# Patient Record
Sex: MALE | Employment: FULL TIME | URBAN - NONMETROPOLITAN AREA
[De-identification: names, ages, dates, MRNs, and addresses within clinical notes are randomized per-mention and may not be internally consistent; named-entity substitution may affect disease eponyms.]

---

## 2018-03-22 ENCOUNTER — TELEPHONE (OUTPATIENT)
Dept: SURGERY | Age: 58
End: 2018-03-22

## 2022-01-18 ENCOUNTER — HOSPITAL ENCOUNTER (OUTPATIENT)
Dept: GENERAL RADIOLOGY | Age: 62
Discharge: HOME | End: 2022-01-18
Payer: COMMERCIAL

## 2022-01-18 ENCOUNTER — HOSPITAL ENCOUNTER (OUTPATIENT)
Age: 62
Discharge: HOME | End: 2022-01-18
Payer: COMMERCIAL

## 2022-01-18 DIAGNOSIS — M25.512 LEFT SHOULDER PAIN, UNSPECIFIED CHRONICITY: ICD-10-CM

## 2022-01-18 DIAGNOSIS — M25.522 LEFT ELBOW PAIN: ICD-10-CM

## 2022-01-18 PROCEDURE — 73080 X-RAY EXAM OF ELBOW: CPT

## 2022-01-18 PROCEDURE — 73030 X-RAY EXAM OF SHOULDER: CPT

## 2024-03-26 ENCOUNTER — OFFICE VISIT (OUTPATIENT)
Age: 64
End: 2024-03-26

## 2024-03-26 VITALS
HEART RATE: 74 BPM | SYSTOLIC BLOOD PRESSURE: 142 MMHG | TEMPERATURE: 97.9 F | HEIGHT: 71 IN | OXYGEN SATURATION: 96 % | WEIGHT: 259 LBS | DIASTOLIC BLOOD PRESSURE: 68 MMHG | RESPIRATION RATE: 18 BRPM | BODY MASS INDEX: 36.26 KG/M2

## 2024-03-26 DIAGNOSIS — E11.9 TYPE 2 DIABETES MELLITUS WITHOUT COMPLICATION, WITHOUT LONG-TERM CURRENT USE OF INSULIN (HCC): Primary | ICD-10-CM

## 2024-03-26 DIAGNOSIS — I10 PRIMARY HYPERTENSION: ICD-10-CM

## 2024-03-26 LAB — HBA1C MFR BLD: 11.2 %

## 2024-03-26 RX ORDER — COVID-19 ANTIGEN TEST
440 KIT MISCELLANEOUS PRN
COMMUNITY

## 2024-03-26 RX ORDER — METFORMIN HYDROCHLORIDE EXTENDED-RELEASE TABLETS 1000 MG/1
1 TABLET, FILM COATED, EXTENDED RELEASE ORAL 2 TIMES DAILY WITH MEALS
COMMUNITY
End: 2024-03-26

## 2024-03-26 RX ORDER — CLOPIDOGREL BISULFATE 75 MG/1
75 TABLET ORAL DAILY
COMMUNITY

## 2024-03-26 RX ORDER — ISOSORBIDE MONONITRATE 60 MG/1
60 TABLET, EXTENDED RELEASE ORAL EVERY MORNING
COMMUNITY
Start: 2021-01-28

## 2024-03-26 RX ORDER — ASPIRIN 81 MG/1
81 TABLET ORAL DAILY
COMMUNITY
Start: 2023-09-20

## 2024-03-26 RX ORDER — ATORVASTATIN CALCIUM 40 MG/1
40 TABLET, FILM COATED ORAL DAILY
COMMUNITY

## 2024-03-26 RX ORDER — GLIPIZIDE 5 MG/1
5 TABLET ORAL 2 TIMES DAILY
Qty: 180 TABLET | Refills: 0 | Status: SHIPPED | OUTPATIENT
Start: 2024-03-26 | End: 2024-06-24

## 2024-03-26 NOTE — PROGRESS NOTES
G6 ) ALEX; 1 each by Does not apply route as needed (yearly) Change yearly or as needed, Disp-1 each, R-0Normal  2. Primary hypertension  Assessment & Plan:  His BP is elevated today. He takes Imdur 60 mg daily. He doesn't check his BP at home. He doesn't follow certain diet either. Discussed about hypertension and it's effects on the body and target organ damages. Discussed about cutting salt intake, low fat  and low carb diet. Advised regular aerobic exercise. Advised check his BP at home and make a log and return it to the clinic in 2 to 3 weeks for review. If his BP comes elevated on the log, he might be prescribed Ace inh.   His last lab work is reviewed from 09/20/2023: BUN: 13, Crea: 0.94, GFR: 92.  His labs will be repeated along with his biometric exams on his next office visit.   He is a daily smoker with 0.5 ppd. Discussed about the effects of smoking on his overall health, blood pressure and heart disease. Discussed about possible smoking cessations. He stated that he tried chantix in the past but couldn't complete the treatment and he is not ready to quit. He will think about it, will be discussed on his next visit as well.  He verbalized understanding and agrees above the plan.    Return in about 10 weeks (around 6/4/2024), or if symptoms worsen or fail to improve, for A1C check .     Subjective   SUBJECTIVE/OBJECTIVE:  Yunior Hicks . 64 y.o. male presented to the clinic to re-check his A1C bc his wife is concerned about his BG. Yunior Hicks  Has CAD with 3 stents placements, diabetes mellitus, hypertension, and hyperlipidemia. He is non-compliant with his medications.     CAD: He had 1 stent placement in 10/11/2011 by Dr Parsons, and 2 stents placement in 07/16/2018 by Dr Joey Parr. He follows Dr Parr every 6 to 8 months. His last office visit was 01/04/2024. He takes Plavix, aspirin 81 mg, Imdur 60 mg, and Atorvastatin 40 mg. He had increased chest heaviness and SOB

## 2024-04-01 PROBLEM — H91.8X3 ASYMMETRICAL HEARING LOSS: Status: ACTIVE | Noted: 2022-01-07

## 2024-04-01 PROBLEM — E66.9 OBESITY (BMI 30.0-34.9): Status: ACTIVE | Noted: 2021-09-28

## 2024-04-01 PROBLEM — Z95.5 PRESENCE OF CORONARY ANGIOPLASTY IMPLANT AND GRAFT: Status: ACTIVE | Noted: 2022-01-07

## 2024-04-01 PROBLEM — E11.9 TYPE 2 DIABETES MELLITUS WITHOUT COMPLICATION, WITHOUT LONG-TERM CURRENT USE OF INSULIN (HCC): Status: ACTIVE | Noted: 2021-02-08

## 2024-04-01 PROBLEM — E66.811 OBESITY (BMI 30.0-34.9): Status: ACTIVE | Noted: 2021-09-28

## 2024-04-01 RX ORDER — PROCHLORPERAZINE 25 MG/1
3 SUPPOSITORY RECTAL
Qty: 3 EACH | Refills: 2 | Status: SHIPPED | OUTPATIENT
Start: 2024-04-01 | End: 2024-06-30

## 2024-04-01 RX ORDER — PROCHLORPERAZINE 25 MG/1
1 SUPPOSITORY RECTAL
Qty: 1 EACH | Refills: 0 | Status: SHIPPED | OUTPATIENT
Start: 2024-04-01 | End: 2024-06-30

## 2024-04-01 RX ORDER — PROCHLORPERAZINE 25 MG/1
1 SUPPOSITORY RECTAL PRN
Qty: 1 EACH | Refills: 0 | Status: SHIPPED | OUTPATIENT
Start: 2024-04-01 | End: 2025-04-01

## 2024-04-01 ASSESSMENT — ENCOUNTER SYMPTOMS
EYE PAIN: 0
EYE REDNESS: 0
EYE ITCHING: 0
EYE DISCHARGE: 0

## 2024-04-01 NOTE — ASSESSMENT & PLAN NOTE
His diabetes mellitus is NOT controlled. His A1C is 11.2, worsening from his last OV, which was 8.6. He is out of his Glipizide since December. Discussed about the compliance of his medications. He also accepts that he takes his Metformin in the morning, but evening dose is \"hit or miss\". He has glucometer at home, but doesn't check his BG at home. He states that his fingers become painful and very tender, he can't do his job. He also states that if his BG comes elevated, he becomes frustrated bc he doesn't have anything to lower it.  Discussed about Dexcom G6 continuous blood glucose monitoring devices. He agrees to wear those if approval retrieved. Discussed about making sure taking his medication as directed, if he gets low, make sure call for the refill. Discussed about low carb diet, regular aerobic exercise, and monitor his BG. It is important for providers to adjust his medications.   He verbalized understanding and agrees above the plan.

## 2024-04-01 NOTE — ASSESSMENT & PLAN NOTE
His BP is elevated today. He takes Imdur 60 mg daily. He doesn't check his BP at home. He doesn't follow certain diet either. Discussed about hypertension and it's effects on the body and target organ damages. Discussed about cutting salt intake, low fat  and low carb diet. Advised regular aerobic exercise. Advised check his BP at home and make a log and return it to the clinic in 2 to 3 weeks for review. If his BP comes elevated on the log, he might be prescribed Ace inh.   His last lab work is reviewed from 09/20/2023: BUN: 13, Crea: 0.94, GFR: 92.  His labs will be repeated along with his biometric exams on his next office visit.   He is a daily smoker with 0.5 ppd. Discussed about the effects of smoking on his overall health, blood pressure and heart disease. Discussed about possible smoking cessations. He stated that he tried chantix in the past but couldn't complete the treatment and he is not ready to quit. He will think about it, will be discussed on his next visit as well.  He verbalized understanding and agrees above the plan.

## 2024-06-11 ENCOUNTER — OFFICE VISIT (OUTPATIENT)
Age: 64
End: 2024-06-11

## 2024-06-11 VITALS
SYSTOLIC BLOOD PRESSURE: 142 MMHG | HEART RATE: 83 BPM | RESPIRATION RATE: 18 BRPM | BODY MASS INDEX: 35.14 KG/M2 | WEIGHT: 251 LBS | TEMPERATURE: 97.8 F | HEIGHT: 71 IN | DIASTOLIC BLOOD PRESSURE: 78 MMHG | OXYGEN SATURATION: 97 %

## 2024-06-11 DIAGNOSIS — L98.9 SKIN LESION: ICD-10-CM

## 2024-06-11 DIAGNOSIS — I25.10 ATHEROSCLEROSIS OF NATIVE CORONARY ARTERY OF NATIVE HEART WITHOUT ANGINA PECTORIS: ICD-10-CM

## 2024-06-11 DIAGNOSIS — E11.9 TYPE 2 DIABETES MELLITUS WITHOUT COMPLICATION, WITHOUT LONG-TERM CURRENT USE OF INSULIN (HCC): Primary | ICD-10-CM

## 2024-06-11 LAB — HBA1C MFR BLD: 8.1 %

## 2024-06-11 RX ORDER — GLIPIZIDE 10 MG/1
10 TABLET ORAL DAILY
Qty: 90 TABLET | Refills: 0 | Status: SHIPPED | OUTPATIENT
Start: 2024-06-11

## 2024-06-11 NOTE — PROGRESS NOTES
SUBJECTIVE/OBJECTIVE:  Yunior Hicks Jr. 64 y.o. male presented to the clinic to re-check his A1C , A1c on 03/26/24 was 11.2  CAD: He had 1 stent placement in 10/11/2011 by Dr Parsons, and 2 stents placement in 07/16/2018 by dr. Parr, he aslo had  1 in 2007 by another cardiologist.  Smokes 0.5ppd since age 15. He follows Dr Parr every 6 months,reports he needs a new cardiology referral.He takes Plavix, aspirin 81 mg, Imdur 60 mg, and Atorvastatin 40 mg.   Hyperlipidemia: Controlled with Atorvastatin 40 mg tab daily at bedtime.   htn : dr parr took him off bp. Pt reports he Smoked about 5 minutes before visit and bp a  little elevated.    Diabetes  Has hx of not adhering to medical plan. At  his last visit he told the NP that he takes his morning meds regularly, but it is \"hit or miss\" evening medications. He also reported he was out of his medication since December, but did not follow up until 3mo. His  A1c was 11.2.  He was started on Glipizide 5 mg BID to continue with his Metformin 1000 BID last visit. Since then he Denies any episodes of hypoglycemia.   No particular diet  Does not check blood sugar at home, waiting on  continouous gluometer , rx wrote in April but has not been dispensed by pharmacy  No routine exercise regimen.     MSK   Bilateral knee pain, tripped on boat because he missed a step, and hurt left arm.              Review of Systems   Constitutional:  Negative for appetite change, chills, diaphoresis, fatigue and fever.   HENT:  Positive for  tinnitus (residual vertigo 2-3 years ago findings of left ear damage). Negative for ear discharge, ear pain, sinus pressure, sinus pain and sore throat.        Eyes:  Negative for pain, discharge, redness and itching. Wears glasses, last apt 4 years ago.    Respiratory:  Negative for cough, shortness of breath and wheezing.        Cardiovascular:  Negative for chest pain, palpitations and leg swelling.   Gastrointestinal: Occasional diarrhea with

## 2024-10-03 ENCOUNTER — OFFICE VISIT (OUTPATIENT)
Age: 64
End: 2024-10-03

## 2024-10-03 DIAGNOSIS — Z76.0 MEDICATION REFILL: Primary | ICD-10-CM

## 2024-10-03 DIAGNOSIS — E11.9 TYPE 2 DIABETES MELLITUS WITHOUT COMPLICATION, WITHOUT LONG-TERM CURRENT USE OF INSULIN (HCC): ICD-10-CM

## 2024-10-03 PROBLEM — R73.01 FASTING HYPERGLYCEMIA: Status: ACTIVE | Noted: 2018-09-05

## 2024-10-03 PROBLEM — S46.211A RUPTURE OF RIGHT PROXIMAL BICEPS TENDON: Status: ACTIVE | Noted: 2024-10-03

## 2024-10-03 PROBLEM — H61.20 EXCESSIVE CERUMEN IN EAR CANAL: Status: ACTIVE | Noted: 2021-04-13

## 2024-10-03 PROBLEM — R26.2 DIFFICULTY IN WALKING: Status: ACTIVE | Noted: 2021-02-22

## 2024-10-03 PROBLEM — N40.1 LOWER URINARY TRACT SYMPTOMS DUE TO BENIGN PROSTATIC HYPERPLASIA: Status: ACTIVE | Noted: 2022-01-07

## 2024-10-03 PROBLEM — I20.89 STABLE ANGINA (HCC): Status: ACTIVE | Noted: 2022-01-07

## 2024-10-03 PROBLEM — I21.4 ACUTE NON-ST SEGMENT ELEVATION MYOCARDIAL INFARCTION (HCC): Status: ACTIVE | Noted: 2022-01-07

## 2024-10-03 PROBLEM — R42 DIZZINESS: Status: ACTIVE | Noted: 2021-04-13

## 2024-10-03 PROBLEM — N52.01 ERECTILE DYSFUNCTION DUE TO ARTERIAL INSUFFICIENCY: Status: ACTIVE | Noted: 2022-01-07

## 2024-10-03 PROBLEM — R06.09 DYSPNEA ON EXERTION: Status: ACTIVE | Noted: 2022-01-07

## 2024-10-29 ENCOUNTER — OFFICE VISIT (OUTPATIENT)
Age: 64
End: 2024-10-29

## 2024-10-29 VITALS — SYSTOLIC BLOOD PRESSURE: 138 MMHG | DIASTOLIC BLOOD PRESSURE: 88 MMHG

## 2024-10-29 VITALS
WEIGHT: 243 LBS | SYSTOLIC BLOOD PRESSURE: 138 MMHG | HEART RATE: 85 BPM | TEMPERATURE: 98.2 F | HEIGHT: 71 IN | BODY MASS INDEX: 34.02 KG/M2 | DIASTOLIC BLOOD PRESSURE: 88 MMHG

## 2024-10-29 DIAGNOSIS — I10 PRIMARY HYPERTENSION: ICD-10-CM

## 2024-10-29 DIAGNOSIS — E78.2 MIXED HYPERLIPIDEMIA: ICD-10-CM

## 2024-10-29 DIAGNOSIS — Z00.00 WELLNESS EXAMINATION: Primary | ICD-10-CM

## 2024-10-29 DIAGNOSIS — E11.9 TYPE 2 DIABETES MELLITUS WITHOUT COMPLICATION, WITHOUT LONG-TERM CURRENT USE OF INSULIN (HCC): ICD-10-CM

## 2024-10-29 DIAGNOSIS — Z00.8 ENCOUNTER FOR BIOMETRIC SCREENING: Primary | ICD-10-CM

## 2024-10-29 LAB
CHOLESTEROL: 0 MG/DL
CHP ED QC CHECK: ABNORMAL
GLUCOSE BLD-MCNC: 142 MG/DL
HIGH DENSITY CHOLESTEROL: 34 MG/DL
NICOTINE: POSITIVE
TRIGL SERPL-MCNC: 154 MG/DL

## 2024-10-29 RX ORDER — GLIPIZIDE 5 MG/1
5 TABLET ORAL DAILY
Qty: 90 TABLET | Refills: 0 | Status: SHIPPED | OUTPATIENT
Start: 2024-10-29 | End: 2025-01-27

## 2024-10-29 RX ORDER — GLIPIZIDE 10 MG/1
10 TABLET ORAL DAILY
Qty: 90 TABLET | Refills: 0 | Status: SHIPPED | OUTPATIENT
Start: 2024-10-29

## 2024-10-29 SDOH — ECONOMIC STABILITY: TRANSPORTATION INSECURITY
IN THE PAST 12 MONTHS, HAS LACK OF TRANSPORTATION KEPT YOU FROM MEETINGS, WORK, OR FROM GETTING THINGS NEEDED FOR DAILY LIVING?: NO

## 2024-10-29 SDOH — SOCIAL STABILITY: SOCIAL INSECURITY: WITHIN THE LAST YEAR, HAVE YOU BEEN HUMILIATED OR EMOTIONALLY ABUSED IN OTHER WAYS BY YOUR PARTNER OR EX-PARTNER?: NO

## 2024-10-29 SDOH — SOCIAL STABILITY: SOCIAL INSECURITY
WITHIN THE LAST YEAR, HAVE YOU BEEN KICKED, HIT, SLAPPED, OR OTHERWISE PHYSICALLY HURT BY YOUR PARTNER OR EX-PARTNER?: NO

## 2024-10-29 SDOH — HEALTH STABILITY: MENTAL HEALTH
STRESS IS WHEN SOMEONE FEELS TENSE, NERVOUS, ANXIOUS, OR CAN'T SLEEP AT NIGHT BECAUSE THEIR MIND IS TROUBLED. HOW STRESSED ARE YOU?: ONLY A LITTLE

## 2024-10-29 SDOH — HEALTH STABILITY: MENTAL HEALTH: HOW OFTEN DO YOU HAVE A DRINK CONTAINING ALCOHOL?: NEVER

## 2024-10-29 SDOH — SOCIAL STABILITY: SOCIAL NETWORK: ARE YOU MARRIED, WIDOWED, DIVORCED, SEPARATED, NEVER MARRIED, OR LIVING WITH A PARTNER?: MARRIED

## 2024-10-29 SDOH — SOCIAL STABILITY: SOCIAL NETWORK
IN A TYPICAL WEEK, HOW MANY TIMES DO YOU TALK ON THE PHONE WITH FAMILY, FRIENDS, OR NEIGHBORS?: MORE THAN THREE TIMES A WEEK

## 2024-10-29 SDOH — SOCIAL STABILITY: SOCIAL INSECURITY
WITHIN THE LAST YEAR, HAVE TO BEEN RAPED OR FORCED TO HAVE ANY KIND OF SEXUAL ACTIVITY BY YOUR PARTNER OR EX-PARTNER?: NO

## 2024-10-29 SDOH — SOCIAL STABILITY: SOCIAL NETWORK: HOW OFTEN DO YOU ATTEND CHURCH OR RELIGIOUS SERVICES?: 1 TO 4 TIMES PER YEAR

## 2024-10-29 SDOH — SOCIAL STABILITY: SOCIAL NETWORK: HOW OFTEN DO YOU ATTENT MEETINGS OF THE CLUB OR ORGANIZATION YOU BELONG TO?: 1 TO 4 TIMES PER YEAR

## 2024-10-29 SDOH — ECONOMIC STABILITY: FOOD INSECURITY: WITHIN THE PAST 12 MONTHS, THE FOOD YOU BOUGHT JUST DIDN'T LAST AND YOU DIDN'T HAVE MONEY TO GET MORE.: NEVER TRUE

## 2024-10-29 SDOH — ECONOMIC STABILITY: INCOME INSECURITY: IN THE LAST 12 MONTHS, WAS THERE A TIME WHEN YOU WERE NOT ABLE TO PAY THE MORTGAGE OR RENT ON TIME?: NO

## 2024-10-29 SDOH — SOCIAL STABILITY: SOCIAL NETWORK
DO YOU BELONG TO ANY CLUBS OR ORGANIZATIONS SUCH AS CHURCH GROUPS UNIONS, FRATERNAL OR ATHLETIC GROUPS, OR SCHOOL GROUPS?: YES

## 2024-10-29 SDOH — ECONOMIC STABILITY: FOOD INSECURITY: WITHIN THE PAST 12 MONTHS, YOU WORRIED THAT YOUR FOOD WOULD RUN OUT BEFORE YOU GOT MONEY TO BUY MORE.: NEVER TRUE

## 2024-10-29 SDOH — SOCIAL STABILITY: SOCIAL INSECURITY: WITHIN THE LAST YEAR, HAVE YOU BEEN AFRAID OF YOUR PARTNER OR EX-PARTNER?: NO

## 2024-10-29 SDOH — HEALTH STABILITY: MENTAL HEALTH: HOW MANY STANDARD DRINKS CONTAINING ALCOHOL DO YOU HAVE ON A TYPICAL DAY?: PATIENT DOES NOT DRINK

## 2024-10-29 SDOH — ECONOMIC STABILITY: INCOME INSECURITY: HOW HARD IS IT FOR YOU TO PAY FOR THE VERY BASICS LIKE FOOD, HOUSING, MEDICAL CARE, AND HEATING?: NOT HARD AT ALL

## 2024-10-29 SDOH — ECONOMIC STABILITY: TRANSPORTATION INSECURITY
IN THE PAST 12 MONTHS, HAS THE LACK OF TRANSPORTATION KEPT YOU FROM MEDICAL APPOINTMENTS OR FROM GETTING MEDICATIONS?: NO

## 2024-10-29 SDOH — HEALTH STABILITY: PHYSICAL HEALTH: ON AVERAGE, HOW MANY DAYS PER WEEK DO YOU ENGAGE IN MODERATE TO STRENUOUS EXERCISE (LIKE A BRISK WALK)?: 0 DAYS

## 2024-10-29 SDOH — SOCIAL STABILITY: SOCIAL NETWORK: HOW OFTEN DO YOU GET TOGETHER WITH FRIENDS OR RELATIVES?: TWICE A WEEK

## 2024-10-29 SDOH — HEALTH STABILITY: PHYSICAL HEALTH: ON AVERAGE, HOW MANY MINUTES DO YOU ENGAGE IN EXERCISE AT THIS LEVEL?: 0 MIN

## 2024-10-29 NOTE — PROGRESS NOTES
Yunior Hicks Jr. (:  1960) is a 64 y.o. male,Established patient, here for evaluation of the following chief complaint(s):  Wellness Program      Assessment & Plan :  Visit Diagnoses and Associated Orders       Wellness examination    -  Primary    CBC with Auto Differential [20487 Custom]   - Future Order    COLLECTION VENOUS BLOOD,VENIPUNCTURE [66595 CPT(R)]      Comprehensive Metabolic Panel [24490 Custom]   - Future Order    Hemoglobin A1C [54186 Custom]   - Future Order    Hepatic Function Panel [75657 Custom]   - Future Order    Iron and TIBC [85161 Custom]   - Future Order    Lipid Panel [22060 Custom]   - Future Order    Magnesium [61282 Custom]   - Future Order    PSA Screening [ Custom]   - Future Order    TSH with Reflex [72802 Custom]   - Future Order    Vitamin B12 & Folate [07648 Custom]   - Future Order    Vitamin D 25 Hydroxy [28217 Custom]   - Future Order         Primary hypertension             Type 2 diabetes mellitus without complication, without long-term current use of insulin (HCC)        glipiZIDE (GLUCOTROL) 10 MG tablet [[id number]]      glipiZIDE (GLUCOTROL) 5 MG tablet [[id number]]           Mixed hyperlipidemia                   64-year-old male presents to clinic today for wellness visit for insurance purposes.  Reviewed biometric results with patient, and monitor chronic illnesses.  Patient states he has been out of glipizide 10 mg for a few days.    Plan   Try ibuprofen or Tylenol for right shoulder pain and return in 2 weeks   Glipizide medication reordered  patient patient to return in 2 weeks for wellness labs to be drawn.  Patient to take medications as ordered.  Recommended patient to increase aerobic exercise and start low-fat low sugar and low sodium diet.  Recommended patient to follow-up as needed.             Subjective :  64-year-old male presents to clinic for wellness examination and follow-up on chronic conditions.  Patient verbalizes some right shoulder pain

## 2024-11-02 PROBLEM — I25.2 HISTORY OF ACUTE MYOCARDIAL INFARCTION: Status: ACTIVE | Noted: 2022-01-07

## 2025-02-05 DIAGNOSIS — E11.9 TYPE 2 DIABETES MELLITUS WITHOUT COMPLICATION, WITHOUT LONG-TERM CURRENT USE OF INSULIN (HCC): ICD-10-CM

## 2025-02-05 RX ORDER — GLIPIZIDE 5 MG/1
5 TABLET ORAL DAILY
Qty: 90 TABLET | Refills: 0 | Status: SHIPPED | OUTPATIENT
Start: 2025-02-05 | End: 2025-05-06

## 2025-02-05 RX ORDER — GLIPIZIDE 10 MG/1
10 TABLET ORAL DAILY
Qty: 90 TABLET | Refills: 0 | Status: SHIPPED | OUTPATIENT
Start: 2025-02-05

## 2025-03-07 ENCOUNTER — OFFICE VISIT (OUTPATIENT)
Age: 65
End: 2025-03-07

## 2025-03-07 VITALS — SYSTOLIC BLOOD PRESSURE: 128 MMHG | DIASTOLIC BLOOD PRESSURE: 76 MMHG

## 2025-03-07 DIAGNOSIS — Z00.00 ENCOUNTER FOR WELLNESS EXAMINATION: Primary | ICD-10-CM

## 2025-03-07 LAB
ALBUMIN SERPL BCG-MCNC: 4.2 G/DL (ref 3.4–4.9)
ALP SERPL-CCNC: 90 U/L (ref 40–129)
ALT SERPL W/O P-5'-P-CCNC: 29 U/L (ref 10–50)
ANION GAP SERPL CALC-SCNC: 14 MEQ/L (ref 8–16)
AST SERPL-CCNC: 29 U/L (ref 10–50)
BILIRUB SERPL-MCNC: < 0.2 MG/DL (ref 0.3–1.2)
BUN SERPL-MCNC: 16 MG/DL (ref 8–23)
CALCIUM SERPL-MCNC: 9.2 MG/DL (ref 8.8–10.2)
CHLORIDE SERPL-SCNC: 105 MEQ/L (ref 98–111)
CHOLEST SERPL-MCNC: 117 MG/DL (ref 100–199)
CHOLESTEROL/HDL RATIO: NORMAL
CHP ED QC CHECK: NORMAL
CO2 SERPL-SCNC: 22 MEQ/L (ref 22–29)
CREAT SERPL-MCNC: 0.9 MG/DL (ref 0.7–1.2)
GFR SERPL CREATININE-BSD FRML MDRD: > 90 ML/MIN/1.73M2
GLUCOSE BLD-MCNC: 146 MG/DL
GLUCOSE SERPL-MCNC: 142 MG/DL (ref 74–109)
HDLC SERPL-MCNC: 34 MG/DL
HDLC SERPL-MCNC: 39 MG/DL (ref 35–70)
LDL CHOLESTEROL: NORMAL
LDLC SERPL CALC-MCNC: 41 MG/DL
MAGNESIUM SERPL-MCNC: 2.2 MG/DL (ref 1.6–2.6)
NICOTINE: POSITIVE
POTASSIUM SERPL-SCNC: 4.5 MEQ/L (ref 3.5–5.2)
PROT SERPL-MCNC: 7.1 G/DL (ref 6.4–8.3)
SODIUM SERPL-SCNC: 141 MEQ/L (ref 135–145)
SUM TOTAL CHOLESTEROL: NORMAL
TRIGL SERPL-MCNC: 181 MG/DL
TRIGL SERPL-MCNC: 211 MG/DL (ref 0–199)
VLDLC SERPL CALC-MCNC: NORMAL MG/DL

## 2025-03-10 ENCOUNTER — RESULTS FOLLOW-UP (OUTPATIENT)
Age: 65
End: 2025-03-10

## 2025-03-14 ENCOUNTER — OFFICE VISIT (OUTPATIENT)
Age: 65
End: 2025-03-14

## 2025-03-14 VITALS
RESPIRATION RATE: 20 BRPM | BODY MASS INDEX: 35 KG/M2 | OXYGEN SATURATION: 98 % | HEIGHT: 71 IN | TEMPERATURE: 97.9 F | DIASTOLIC BLOOD PRESSURE: 82 MMHG | HEART RATE: 76 BPM | SYSTOLIC BLOOD PRESSURE: 140 MMHG | WEIGHT: 250 LBS

## 2025-03-14 DIAGNOSIS — E78.2 MIXED HYPERLIPIDEMIA: ICD-10-CM

## 2025-03-14 DIAGNOSIS — Z71.6 ENCOUNTER FOR SMOKING CESSATION COUNSELING: ICD-10-CM

## 2025-03-14 DIAGNOSIS — E11.9 TYPE 2 DIABETES MELLITUS WITHOUT COMPLICATION, WITHOUT LONG-TERM CURRENT USE OF INSULIN (HCC): Primary | ICD-10-CM

## 2025-03-14 LAB — HBA1C MFR BLD: 8.1 %

## 2025-03-14 RX ORDER — VARENICLINE TARTRATE 1 MG/1
TABLET, FILM COATED ORAL
Qty: 56 TABLET | Refills: 2 | Status: SHIPPED | OUTPATIENT
Start: 2025-03-14

## 2025-03-14 RX ORDER — ATORVASTATIN CALCIUM 80 MG/1
80 TABLET, FILM COATED ORAL DAILY
Qty: 90 TABLET | Refills: 0 | Status: SHIPPED | OUTPATIENT
Start: 2025-03-14 | End: 2025-06-12

## 2025-03-14 NOTE — PROGRESS NOTES
Yunior Hicks Jr. (:  1960) is a 64 y.o. male,Established patient, here for evaluation of the following chief complaint(s):  Other (Medication to stop smoking)      Assessment & Plan :  Visit Diagnoses and Associated Orders         Type 2 diabetes mellitus without complication, without long-term current use of insulin (HCC)    -  Primary    POCT glycosylated hemoglobin (Hb A1C) [10287 Custom]      Tirzepatide 2.5 MG/0.5ML SOAJ [[id number]]             Encounter for smoking cessation counseling        varenicline (CHANTIX CONTINUING MONTH ANTHONY) 1 MG tablet [[id number]]             Mixed hyperlipidemia        atorvastatin (LIPITOR) 80 MG tablet [70371]               64-year-old male presents to clinic today for management of chronic illnesses, and for smoking cessation.  Patient states he has been smoking for approximately 40 years.  Patient states he smokes a pack and 1/2 to 2 packs of cigarettes a day.  Patient states he has tried Chantix in the past but has not really given a chance to work.  Smoking cessation:  Discussed at length with patient medication Chantix answered all questions, and unwanted side effects.  Patient verbalizes understanding  Diabetes:  POC A1c completed today in the office patient's lab results were 8.1.  Talked with patient at length about diet and exercise, patient states he has not been watching his diet and is not exercising at this time.  Discussed adding Mounjaro 2.5 mg subcu weekly.  Hyperlipidemia:  Discussed abnormal lab values with patient triglycerides 211 patient is currently taking Lipitor 40 mg.  Discussed changing Lipitor to 80 mg every evening, and diet and exercise changes.  Patient verbalizes understanding and is in agreement with treatment plan.  Recommended patient to follow-up in 1 month or sooner if needed.           Subjective :  Smoking cessation, monitoring of chronic illnesses, and reviewing lab values        ROS  As documented in HPI.  A thorough 12 point review

## 2025-03-14 NOTE — PATIENT INSTRUCTIONS
Take medication as prescribed  Tylenol or ibuprofen for pain or discomfort  Follow up as needed   If symptoms worsen return to clinic or go to ER  Thank you for choosing [facility]

## 2025-04-14 ENCOUNTER — OFFICE VISIT (OUTPATIENT)
Age: 65
End: 2025-04-14

## 2025-04-14 VITALS
SYSTOLIC BLOOD PRESSURE: 142 MMHG | BODY MASS INDEX: 33.18 KG/M2 | OXYGEN SATURATION: 95 % | HEART RATE: 101 BPM | DIASTOLIC BLOOD PRESSURE: 78 MMHG | RESPIRATION RATE: 18 BRPM | TEMPERATURE: 97.9 F | WEIGHT: 245 LBS | HEIGHT: 72 IN

## 2025-04-14 DIAGNOSIS — E11.9 TYPE 2 DIABETES MELLITUS WITHOUT COMPLICATION, WITHOUT LONG-TERM CURRENT USE OF INSULIN: Primary | ICD-10-CM

## 2025-04-14 RX ORDER — PROCHLORPERAZINE 25 MG/1
1 SUPPOSITORY RECTAL
Qty: 4 EACH | Refills: 2 | Status: SHIPPED | OUTPATIENT
Start: 2025-04-14

## 2025-04-14 ASSESSMENT — ENCOUNTER SYMPTOMS
VISUAL CHANGE: 0
BLURRED VISION: 0

## 2025-04-14 NOTE — PATIENT INSTRUCTIONS
Take medication as prescribed  Tylenol or ibuprofen for pain or discomfort  Follow up as needed   If symptoms worsen return to clinic or go to ER  Thank you for choosing [facility]

## 2025-04-14 NOTE — PROGRESS NOTES
Yunior Hicks Jr. (:  1960) is a 65 y.o. male,Established patient, here for evaluation of the following chief complaint(s):  Other (Weight Loss)      Assessment & Plan :  Visit Diagnoses and Associated Orders         Type 2 diabetes mellitus without complication, without long-term current use of insulin    -  Primary    Tirzepatide 5 MG/0.5ML SOAJ [[id number]]      Continuous Glucose Transmitter (DEXCOM G6 TRANSMITTER) MISC [[id number]]                 65-year-old male presents to clinic today for monitoring of type 2 diabetes.  Patient has been on Mounjaro for 1 month and has lost 5 pounds.  Patient states he does not check his glucose daily because it is very painful when he sticks his fingers.  Discussed at length Dexcom with patient patient is willing to try to monitor his glucose that way if insurance will pay for the transmitter.  Discussed at length treatment plan with patient will increase Mounjaro to 5 mg weekly, patient is in agreement with treatment plan.  Recommended patient to follow-up in 1 month or sooner if needed.         Subjective :    Diabetes  He presents for his follow-up diabetic visit. He has type 2 diabetes mellitus. No MedicAlert identification noted. His disease course has been improving. There are no hypoglycemic associated symptoms. Pertinent negatives for hypoglycemia include no confusion, dizziness, headaches, hunger, mood changes, nervousness/anxiousness, pallor, seizures, sleepiness, speech difficulty, sweats or tremors. Associated symptoms include weight loss. Pertinent negatives for diabetes include no blurred vision, no chest pain, no fatigue, no foot paresthesias, no foot ulcerations, no polydipsia, no polyphagia, no polyuria, no visual change and no weakness. There are no hypoglycemic complications. Pertinent negatives for hypoglycemia complications include no blackouts, no hospitalization, no nocturnal hypoglycemia, no required assistance and no required glucagon

## 2025-04-15 ENCOUNTER — OFFICE VISIT (OUTPATIENT)
Age: 65
End: 2025-04-15

## 2025-04-15 DIAGNOSIS — Z01.89 ROUTINE LAB DRAW: Primary | ICD-10-CM

## 2025-04-15 LAB
ALBUMIN SERPL BCG-MCNC: 4.3 G/DL (ref 3.4–4.9)
ALP SERPL-CCNC: 104 U/L (ref 40–129)
ALT SERPL W/O P-5'-P-CCNC: 38 U/L (ref 10–50)
ANION GAP SERPL CALC-SCNC: 12 MEQ/L (ref 8–16)
AST SERPL-CCNC: 33 U/L (ref 10–50)
BASOPHILS ABSOLUTE: ABNORMAL THOU/MM3 (ref 0–0.1)
BASOPHILS NFR BLD AUTO: ABNORMAL %
BILIRUB SERPL-MCNC: 0.2 MG/DL (ref 0.3–1.2)
BUN SERPL-MCNC: 17 MG/DL (ref 8–23)
CALCIUM SERPL-MCNC: 9.9 MG/DL (ref 8.8–10.2)
CHLORIDE SERPL-SCNC: 106 MEQ/L (ref 98–111)
CHOLEST SERPL-MCNC: 122 MG/DL (ref 100–199)
CO2 SERPL-SCNC: 22 MEQ/L (ref 22–29)
CREAT SERPL-MCNC: 0.9 MG/DL (ref 0.7–1.2)
DEPRECATED RDW RBC AUTO: 45.5 FL (ref 35–45)
EOSINOPHIL NFR BLD AUTO: ABNORMAL %
EOSINOPHILS ABSOLUTE: ABNORMAL THOU/MM3 (ref 0–0.4)
ERYTHROCYTE [DISTWIDTH] IN BLOOD BY AUTOMATED COUNT: 13.4 % (ref 11.5–14.5)
GFR SERPL CREATININE-BSD FRML MDRD: > 90 ML/MIN/1.73M2
GLUCOSE SERPL-MCNC: 162 MG/DL (ref 74–109)
HCT VFR BLD AUTO: 46.4 % (ref 42–52)
HDLC SERPL-MCNC: 41 MG/DL
HGB BLD-MCNC: 15.2 GM/DL (ref 14–18)
IMM GRANULOCYTES # BLD AUTO: ABNORMAL THOU/MM3 (ref 0–0.07)
IMM GRANULOCYTES NFR BLD AUTO: ABNORMAL %
INR PPP: 0.93 (ref 0.85–1.13)
LDLC SERPL CALC-MCNC: 53 MG/DL
LYMPHOCYTES ABSOLUTE: ABNORMAL THOU/MM3 (ref 1–4.8)
LYMPHOCYTES NFR BLD AUTO: ABNORMAL %
MAGNESIUM SERPL-MCNC: 2.5 MG/DL (ref 1.6–2.6)
MCH RBC QN AUTO: 30.5 PG (ref 26–33)
MCHC RBC AUTO-ENTMCNC: 32.8 GM/DL (ref 32.2–35.5)
MCV RBC AUTO: 93 FL (ref 80–94)
MONOCYTES ABSOLUTE: ABNORMAL THOU/MM3 (ref 0.4–1.3)
MONOCYTES NFR BLD AUTO: ABNORMAL %
NEUTROPHILS ABSOLUTE: ABNORMAL THOU/MM3 (ref 1.8–7.7)
NEUTROPHILS NFR BLD AUTO: ABNORMAL %
NRBC BLD AUTO-RTO: 0 /100 WBC
PLATELET # BLD AUTO: 194 THOU/MM3 (ref 130–400)
PMV BLD AUTO: 10.6 FL (ref 9.4–12.4)
POTASSIUM SERPL-SCNC: 4.7 MEQ/L (ref 3.5–5.2)
PROT SERPL-MCNC: 7.5 G/DL (ref 6.4–8.3)
RBC # BLD AUTO: 4.99 MILL/MM3 (ref 4.7–6.1)
SODIUM SERPL-SCNC: 140 MEQ/L (ref 135–145)
TRIGL SERPL-MCNC: 142 MG/DL (ref 0–199)
TSH SERPL DL<=0.05 MIU/L-ACNC: 1.27 UIU/ML (ref 0.27–4.2)
WBC # BLD AUTO: 6.3 THOU/MM3 (ref 4.8–10.8)

## 2025-05-06 DIAGNOSIS — Z76.0 MEDICATION REFILL: Primary | ICD-10-CM

## 2025-05-06 DIAGNOSIS — E11.9 TYPE 2 DIABETES MELLITUS WITHOUT COMPLICATION, WITHOUT LONG-TERM CURRENT USE OF INSULIN (HCC): ICD-10-CM

## 2025-05-07 RX ORDER — GLIPIZIDE 10 MG/1
10 TABLET ORAL DAILY
Qty: 90 TABLET | Refills: 0 | Status: SHIPPED | OUTPATIENT
Start: 2025-05-07

## 2025-05-07 RX ORDER — GLIPIZIDE 5 MG/1
5 TABLET ORAL DAILY
Qty: 90 TABLET | Refills: 0 | Status: SHIPPED | OUTPATIENT
Start: 2025-05-07 | End: 2025-08-05

## 2025-05-15 ENCOUNTER — OFFICE VISIT (OUTPATIENT)
Age: 65
End: 2025-05-15

## 2025-05-15 DIAGNOSIS — Z01.89 ROUTINE LAB DRAW: Primary | ICD-10-CM

## 2025-05-15 LAB
ANION GAP SERPL CALC-SCNC: 12 MEQ/L (ref 8–16)
BUN SERPL-MCNC: 15 MG/DL (ref 8–23)
CALCIUM SERPL-MCNC: 9.2 MG/DL (ref 8.8–10.2)
CHLORIDE SERPL-SCNC: 105 MEQ/L (ref 98–111)
CO2 SERPL-SCNC: 20 MEQ/L (ref 22–29)
CREAT SERPL-MCNC: 0.9 MG/DL (ref 0.7–1.2)
GFR SERPL CREATININE-BSD FRML MDRD: > 90 ML/MIN/1.73M2
GLUCOSE SERPL-MCNC: 131 MG/DL (ref 74–109)
POTASSIUM SERPL-SCNC: 4.6 MEQ/L (ref 3.5–5.2)
SODIUM SERPL-SCNC: 137 MEQ/L (ref 135–145)

## 2025-05-16 ENCOUNTER — OFFICE VISIT (OUTPATIENT)
Age: 65
End: 2025-05-16

## 2025-05-16 VITALS
HEART RATE: 74 BPM | HEIGHT: 72 IN | SYSTOLIC BLOOD PRESSURE: 120 MMHG | BODY MASS INDEX: 31.83 KG/M2 | OXYGEN SATURATION: 98 % | RESPIRATION RATE: 16 BRPM | WEIGHT: 235 LBS | DIASTOLIC BLOOD PRESSURE: 72 MMHG | TEMPERATURE: 98.2 F

## 2025-05-16 DIAGNOSIS — E66.811 OBESITY (BMI 30.0-34.9): Primary | ICD-10-CM

## 2025-05-16 DIAGNOSIS — Z71.6 ENCOUNTER FOR SMOKING CESSATION COUNSELING: ICD-10-CM

## 2025-05-16 RX ORDER — VARENICLINE TARTRATE 1 MG/1
TABLET, FILM COATED ORAL
Qty: 56 TABLET | Refills: 0 | Status: SHIPPED | OUTPATIENT
Start: 2025-05-16

## 2025-05-16 RX ORDER — ASPIRIN 81 MG/1
81 TABLET, CHEWABLE ORAL DAILY
COMMUNITY

## 2025-05-16 RX ORDER — PRASUGREL 10 MG/1
10 TABLET, FILM COATED ORAL DAILY
COMMUNITY

## 2025-05-16 ASSESSMENT — ENCOUNTER SYMPTOMS
RHINORRHEA: 0
VOMITING: 0
SHORTNESS OF BREATH: 0
ABDOMINAL PAIN: 0
WHEEZING: 0
DIARRHEA: 0
SORE THROAT: 0
NAUSEA: 1
COUGH: 0

## 2025-05-16 NOTE — PROGRESS NOTES
Yunior Hicks Jr. (:  1960) is a 65 y.o. male,Established patient, here for evaluation of the following chief complaint(s):  Weight Loss (And smoking cessation)      Assessment & Plan :  Visit Diagnoses and Associated Orders         Obesity (BMI 30.0-34.9)    -  Primary    tirzepatide-weight management (ZEPBOUND) 7.5 MG/0.5ML SOAJ subCUTAneous auto-injector pen [[id number]]             Encounter for smoking cessation counseling        varenicline (CHANTIX CONTINUING MONTH ) 1 MG tablet [[id number]]           ORDERS WITHOUT AN ASSOCIATED DIAGNOSIS    prasugrel (EFFIENT) 10 MG TABS [13566]      aspirin 81 MG chewable tablet [032]          65-year-old male presents to clinic today for weight loss management, and smoking sensation.  Patient has been on Zepbound for 2 months and patient starting weight was 250 pounds patient's weight today is 235 pounds.  Patient denies any side effects from medication, but does have mild nausea.  Discussed diet and exercise at length with patient and changing lifestyle in order to maintain weight loss after stopping medication.  Patient verbalizes understanding.  Discussed increasing dose and I feel at this time is a appropriate measure to increase Zepbound to 7.5 mg weekly.  Patient states he is down to about 1 pack of cigarettes every 3 days, will reorder Chantix for patient.  Recommend patient to follow-up in 1 month or sooner if needed.  Patient verbalizes understanding and is in agreement with treatment plan.           Subjective :  Weight loss management, and smoking cessation      Weight Loss  Severity:  Mild  Onset quality: Intentional.  Chronicity:  New  Associated symptoms: nausea    Associated symptoms: no abdominal pain, no chest pain, no congestion, no cough, no diarrhea, no ear pain, no fatigue, no fever, no headaches, no loss of consciousness, no myalgias, no rash, no rhinorrhea, no shortness of breath, no sore throat, no vomiting and no wheezing    Nausea:

## 2025-05-20 ENCOUNTER — OFFICE VISIT (OUTPATIENT)
Age: 65
End: 2025-05-20

## 2025-05-20 VITALS
HEART RATE: 76 BPM | SYSTOLIC BLOOD PRESSURE: 118 MMHG | DIASTOLIC BLOOD PRESSURE: 68 MMHG | OXYGEN SATURATION: 97 % | BODY MASS INDEX: 31.83 KG/M2 | HEIGHT: 72 IN | RESPIRATION RATE: 18 BRPM | WEIGHT: 235 LBS | TEMPERATURE: 97.8 F

## 2025-05-20 DIAGNOSIS — E66.811 OBESITY (BMI 30.0-34.9): ICD-10-CM

## 2025-05-20 DIAGNOSIS — Z00.00 WELLNESS EXAMINATION: Primary | ICD-10-CM

## 2025-05-20 DIAGNOSIS — E78.2 MIXED HYPERLIPIDEMIA: ICD-10-CM

## 2025-05-20 DIAGNOSIS — E11.9 TYPE 2 DIABETES MELLITUS WITHOUT COMPLICATION, WITHOUT LONG-TERM CURRENT USE OF INSULIN (HCC): ICD-10-CM

## 2025-05-20 SDOH — SOCIAL STABILITY: SOCIAL INSECURITY: WITHIN THE LAST YEAR, HAVE YOU BEEN HUMILIATED OR EMOTIONALLY ABUSED IN OTHER WAYS BY YOUR PARTNER OR EX-PARTNER?: NO

## 2025-05-20 SDOH — SOCIAL STABILITY: SOCIAL INSECURITY
WITHIN THE LAST YEAR, HAVE YOU BEEN RAPED OR FORCED TO HAVE ANY KIND OF SEXUAL ACTIVITY BY YOUR PARTNER OR EX-PARTNER?: NO

## 2025-05-20 SDOH — HEALTH STABILITY: MENTAL HEALTH
DO YOU FEEL STRESS - TENSE, RESTLESS, NERVOUS, OR ANXIOUS, OR UNABLE TO SLEEP AT NIGHT BECAUSE YOUR MIND IS TROUBLED ALL THE TIME - THESE DAYS?: ONLY A LITTLE

## 2025-05-20 SDOH — ECONOMIC STABILITY: FOOD INSECURITY: WITHIN THE PAST 12 MONTHS, THE FOOD YOU BOUGHT JUST DIDN'T LAST AND YOU DIDN'T HAVE MONEY TO GET MORE.: NEVER TRUE

## 2025-05-20 SDOH — SOCIAL STABILITY: SOCIAL INSECURITY: ARE YOU MARRIED, WIDOWED, DIVORCED, SEPARATED, NEVER MARRIED, OR LIVING WITH A PARTNER?: MARRIED

## 2025-05-20 SDOH — SOCIAL STABILITY: SOCIAL NETWORK: HOW OFTEN DO YOU ATTEND CHURCH OR RELIGIOUS SERVICES?: NEVER

## 2025-05-20 SDOH — SOCIAL STABILITY: SOCIAL NETWORK: HOW OFTEN DO YOU GET TOGETHER WITH FRIENDS OR RELATIVES?: MORE THAN THREE TIMES A WEEK

## 2025-05-20 SDOH — HEALTH STABILITY: MENTAL HEALTH: HOW MANY DRINKS CONTAINING ALCOHOL DO YOU HAVE ON A TYPICAL DAY WHEN YOU ARE DRINKING?: 3 OR 4

## 2025-05-20 SDOH — HEALTH STABILITY: MENTAL HEALTH: HOW OFTEN DO YOU HAVE A DRINK CONTAINING ALCOHOL?: 2-3 TIMES A WEEK

## 2025-05-20 SDOH — HEALTH STABILITY: PHYSICAL HEALTH: ON AVERAGE, HOW MANY MINUTES DO YOU ENGAGE IN EXERCISE AT THIS LEVEL?: 30 MIN

## 2025-05-20 SDOH — ECONOMIC STABILITY: FOOD INSECURITY: HOW HARD IS IT FOR YOU TO PAY FOR THE VERY BASICS LIKE FOOD, HOUSING, MEDICAL CARE, AND HEATING?: NOT VERY HARD

## 2025-05-20 SDOH — HEALTH STABILITY: PHYSICAL HEALTH: ON AVERAGE, HOW MANY DAYS PER WEEK DO YOU ENGAGE IN MODERATE TO STRENUOUS EXERCISE (LIKE A BRISK WALK)?: 4 DAYS

## 2025-05-20 SDOH — SOCIAL STABILITY: SOCIAL INSECURITY: WITHIN THE LAST YEAR, HAVE YOU BEEN AFRAID OF YOUR PARTNER OR EX-PARTNER?: NO

## 2025-05-20 SDOH — ECONOMIC STABILITY: HOUSING INSECURITY: IN THE LAST 12 MONTHS, WAS THERE A TIME WHEN YOU WERE NOT ABLE TO PAY THE MORTGAGE OR RENT ON TIME?: NO

## 2025-05-20 SDOH — ECONOMIC STABILITY: FOOD INSECURITY: WITHIN THE PAST 12 MONTHS, YOU WORRIED THAT YOUR FOOD WOULD RUN OUT BEFORE YOU GOT THE MONEY TO BUY MORE.: NEVER TRUE

## 2025-05-20 SDOH — SOCIAL STABILITY: SOCIAL NETWORK: IN A TYPICAL WEEK, HOW MANY TIMES DO YOU TALK ON THE PHONE WITH FAMILY, FRIENDS, OR NEIGHBORS?: TWICE A WEEK

## 2025-05-20 SDOH — SOCIAL STABILITY: SOCIAL NETWORK
DO YOU BELONG TO ANY CLUBS OR ORGANIZATIONS SUCH AS CHURCH GROUPS, UNIONS, FRATERNAL OR ATHLETIC GROUPS, OR SCHOOL GROUPS?: NO

## 2025-05-20 SDOH — SOCIAL STABILITY: SOCIAL NETWORK: HOW OFTEN DO YOU ATTEND MEETINGS OF THE CLUBS OR ORGANIZATIONS YOU BELONG TO?: NEVER

## 2025-05-20 SDOH — ECONOMIC STABILITY: TRANSPORTATION INSECURITY: IN THE PAST 12 MONTHS, HAS LACK OF TRANSPORTATION KEPT YOU FROM MEDICAL APPOINTMENTS OR FROM GETTING MEDICATIONS?: NO

## 2025-05-20 ASSESSMENT — PATIENT HEALTH QUESTIONNAIRE - PHQ9
2. FEELING DOWN, DEPRESSED OR HOPELESS: NOT AT ALL
SUM OF ALL RESPONSES TO PHQ QUESTIONS 1-9: 0
1. LITTLE INTEREST OR PLEASURE IN DOING THINGS: NOT AT ALL
DEPRESSION UNABLE TO ASSESS: FUNCTIONAL CAPACITY MOTIVATION LIMITS ACCURACY
SUM OF ALL RESPONSES TO PHQ QUESTIONS 1-9: 0

## 2025-05-20 ASSESSMENT — ACTIVITIES OF DAILY LIVING (ADL): LACK_OF_TRANSPORTATION: NO

## 2025-05-20 NOTE — PROGRESS NOTES
Yunior Hicks Jr. (:  1960) is a 65 y.o. male,Established patient, here for evaluation of the following chief complaint(s):  Annual Exam (Wellness)      Assessment & Plan :  Visit Diagnoses and Associated Orders         Wellness examination    -  Primary           Obesity (BMI 30.0-34.9)               Mixed hyperlipidemia               Type 2 diabetes mellitus without complication, without long-term current use of insulin (HCC)                   65-year-old male presents to clinic today for wellness visit and chronic illness management.  Patient verbalizes no concerns at this visit today.  Patient had a heart cath last week and had 1 stent placed, patient states he is feeling much better after the procedure.  Patient follows up with cardiologist this week.  Discussed at length diet, and exercise.  Patient verbalizes he is continuing to work on weight loss.  All patient's labs were reviewed and discussed with patient prior to discharge  Chronic illnesses:  Obesity: Patient is currently working towards weight loss, patient is doing portion control low sugar and high-protein diet.  Patient is also incorporating exercise in his routine.  Mixed hyperlipidemia: Patient is currently taking atorvastatin 80 mg daily, patient's last lipid panel was within normal limits.  Type 2 diabetes: Patient currently is on glipizide 15 mg, metformin 1000 mg, and tirzepatide 7.5 mg weekly.  Patient's last hemoglobin A1c was 8.1.  Reviewed all diagnoses and medications at length with patient.  Patient denies any adverse reactions, or side effects to medications.  No changes needed to medications at this time.  Recommended patient to follow-up in 3 months or sooner if needed patient verbalizes understanding and is in agreement with treatment plan.       Subjective :  Wellness visit for insurance purposes and monitoring chronic illnesses           ROS  As documented in HPI.  A thorough 12 point review of systems was evaluated

## 2025-05-20 NOTE — PATIENT INSTRUCTIONS
Take medication as prescribed  Tylenol or ibuprofen for pain or discomfort  Follow up as needed   If symptoms worsen return to clinic or go to ER  Thank you for choosing [facility]

## 2025-06-19 DIAGNOSIS — Z76.0 MEDICATION REFILL: Primary | ICD-10-CM

## 2025-06-19 DIAGNOSIS — E78.2 MIXED HYPERLIPIDEMIA: ICD-10-CM

## 2025-06-19 DIAGNOSIS — E11.9 TYPE 2 DIABETES MELLITUS WITHOUT COMPLICATION, WITHOUT LONG-TERM CURRENT USE OF INSULIN (HCC): ICD-10-CM

## 2025-06-19 RX ORDER — ATORVASTATIN CALCIUM 80 MG/1
80 TABLET, FILM COATED ORAL DAILY
Qty: 90 TABLET | Refills: 0 | Status: SHIPPED | OUTPATIENT
Start: 2025-06-19 | End: 2025-09-17

## 2025-06-19 NOTE — TELEPHONE ENCOUNTER
Yunior is requesting refill of the two selected medications.  His current weight has been entered as he reported it.  He states he is having no adverse reactions to the medications.

## 2025-07-18 ENCOUNTER — OFFICE VISIT (OUTPATIENT)
Age: 65
End: 2025-07-18

## 2025-07-18 VITALS
TEMPERATURE: 98.4 F | RESPIRATION RATE: 16 BRPM | SYSTOLIC BLOOD PRESSURE: 116 MMHG | HEIGHT: 72 IN | OXYGEN SATURATION: 96 % | HEART RATE: 76 BPM | DIASTOLIC BLOOD PRESSURE: 68 MMHG | BODY MASS INDEX: 30.48 KG/M2 | WEIGHT: 225 LBS

## 2025-07-18 DIAGNOSIS — E66.811 OBESITY (BMI 30.0-34.9): ICD-10-CM

## 2025-07-18 DIAGNOSIS — E11.9 TYPE 2 DIABETES MELLITUS WITHOUT COMPLICATION, WITHOUT LONG-TERM CURRENT USE OF INSULIN (HCC): Primary | ICD-10-CM

## 2025-07-18 DIAGNOSIS — L84 CALLUS OF FOOT: ICD-10-CM

## 2025-07-18 RX ORDER — ISOSORBIDE MONONITRATE 60 MG/1
60 TABLET, EXTENDED RELEASE ORAL DAILY
COMMUNITY

## 2025-07-18 NOTE — PATIENT INSTRUCTIONS
Take medication as prescribed  Tylenol or ibuprofen for pain or discomfort  Follow up as needed   If symptoms worsen return to clinic or go to ER  Thank you for choosing [facility]

## 2025-07-18 NOTE — PROGRESS NOTES
Yunior Hicks Jr. (:  1960) is a 65 y.o. male,Established patient, here for evaluation of the following chief complaint(s):  Weight Loss (Follow up)      Assessment & Plan :  Visit Diagnoses and Associated Orders         Type 2 diabetes mellitus without complication, without long-term current use of insulin (HCC)    -  Primary    Tirzepatide (MOUNJARO) 10 MG/0.5ML SOAJ pen [[id number]]             Obesity (BMI 30.0-34.9)        Tirzepatide (MOUNJARO) 10 MG/0.5ML SOAJ pen [[id number]]             Callus of foot        [facility] - Chilo, Shavonne, DPM, Podiatry, [place] [BQG773 Custom]           ORDERS WITHOUT AN ASSOCIATED DIAGNOSIS    isosorbide mononitrate (IMDUR) 60 MG extended release tablet [[id number]]          65 year old male presents to clinic today for diabetes, weight loss management and referral to podiatry.   Patient has been on mounjaro for 3 months starting weight was 250 pounds, current weight is 225 pounds.  Patient denies any side effects with medication. I feel patient will tolerate higher dose of mounjaro 10 mg weekly.    Evaluation area on bottom of left foot hard, flaky, tenderness with touch  Considered: callus, plantar wart,  Treatment plan: referral to podiatry   Recommend patient to follow-up in 1 month or sooner if needed. Patient verbalizes understanding and is in agreement with treatment plan.            Subjective :  diabetes, weight loss management and referral to podiatry.         History provided by:  Patient    ROS  As documented in HPI.  A thorough 12 point review of systems was evaluated including Constitutional and general appearance, Head, Face, Ears, Eyes, nose, Throat, Cardiovascular,pulmonary, GI, , Skin, and Psychiatric and was found to be negative without symptoms or signs consistent with acute pathology with the exception of that specifically documented in the HPI section of this document.             Vitals:    25 1317   BP: 116/68   BP Site: Right Upper Arm   Patient